# Patient Record
Sex: FEMALE | Race: WHITE | NOT HISPANIC OR LATINO | Employment: UNEMPLOYED | URBAN - METROPOLITAN AREA
[De-identification: names, ages, dates, MRNs, and addresses within clinical notes are randomized per-mention and may not be internally consistent; named-entity substitution may affect disease eponyms.]

---

## 2018-06-04 ENCOUNTER — TELEPHONE (OUTPATIENT)
Dept: SURGERY | Facility: CLINIC | Age: 43
End: 2018-06-04

## 2018-06-04 NOTE — TELEPHONE ENCOUNTER
Spoke with pt and she will be in town the week of 7/2 and would like an appt. Informed pt I do not have anything available that week due to the holiday but I will see if Dr. Lozoya can see her on another day when he is here. I will call pt back for this appt.

## 2018-06-04 NOTE — TELEPHONE ENCOUNTER
----- Message from Nohemi Gregory sent at 6/4/2018  9:06 AM CDT -----  Contact: Pt  Pt is calling to schedule a f/u appt and can be reached at [phone].  Was not able to find any available appts with , offered pt an appt with someone else which she declined.    Thank you

## 2018-06-18 ENCOUNTER — TELEPHONE (OUTPATIENT)
Dept: SURGERY | Facility: CLINIC | Age: 43
End: 2018-06-18

## 2018-06-18 NOTE — TELEPHONE ENCOUNTER
Attempted to call  Pt x2, I notice her an the schedule, I called to offer her an sooner appointment in breast clinic as she had scheduled her self in general surgery.  No answer unable to leave message as the mail box was full

## 2018-06-24 ENCOUNTER — PATIENT MESSAGE (OUTPATIENT)
Dept: SURGERY | Facility: CLINIC | Age: 43
End: 2018-06-24

## 2018-07-03 ENCOUNTER — OFFICE VISIT (OUTPATIENT)
Dept: SURGERY | Facility: CLINIC | Age: 43
End: 2018-07-03
Payer: COMMERCIAL

## 2018-07-03 VITALS
SYSTOLIC BLOOD PRESSURE: 119 MMHG | WEIGHT: 120.38 LBS | HEIGHT: 62 IN | BODY MASS INDEX: 22.15 KG/M2 | DIASTOLIC BLOOD PRESSURE: 71 MMHG | HEART RATE: 78 BPM

## 2018-07-03 VITALS
HEART RATE: 86 BPM | TEMPERATURE: 99 F | HEIGHT: 62 IN | BODY MASS INDEX: 21.86 KG/M2 | WEIGHT: 118.81 LBS | SYSTOLIC BLOOD PRESSURE: 121 MMHG | DIASTOLIC BLOOD PRESSURE: 74 MMHG

## 2018-07-03 DIAGNOSIS — Z43.4 ATTENTION TO ARTIFICIAL OPENING OF DIGESTIVE TRACT: Primary | ICD-10-CM

## 2018-07-03 DIAGNOSIS — N64.52 BLOODY DISCHARGE FROM LEFT NIPPLE: Primary | ICD-10-CM

## 2018-07-03 DIAGNOSIS — K62.4 ANAL STENOSIS: ICD-10-CM

## 2018-07-03 PROCEDURE — 99213 OFFICE O/P EST LOW 20 MIN: CPT | Mod: 25,S$GLB,, | Performed by: COLON & RECTAL SURGERY

## 2018-07-03 PROCEDURE — 45300 PROCTOSIGMOIDOSCOPY DX: CPT | Mod: S$GLB,,, | Performed by: COLON & RECTAL SURGERY

## 2018-07-03 PROCEDURE — 3008F BODY MASS INDEX DOCD: CPT | Mod: CPTII,S$GLB,, | Performed by: COLON & RECTAL SURGERY

## 2018-07-03 PROCEDURE — 99999 PR PBB SHADOW E&M-EST. PATIENT-LVL III: CPT | Mod: PBBFAC,,, | Performed by: COLON & RECTAL SURGERY

## 2018-07-03 PROCEDURE — 99244 OFF/OP CNSLTJ NEW/EST MOD 40: CPT | Mod: S$GLB,,, | Performed by: SURGERY

## 2018-07-03 PROCEDURE — 99999 PR PBB SHADOW E&M-EST. PATIENT-LVL III: CPT | Mod: PBBFAC,,, | Performed by: SURGERY

## 2018-07-03 RX ORDER — CHOLECALCIFEROL (VITAMIN D3) 25 MCG
TABLET ORAL
COMMUNITY

## 2018-07-03 RX ORDER — GINKGO BILOBA LEAF EXTRACT 60 MG
CAPSULE ORAL
COMMUNITY

## 2018-07-03 RX ORDER — IBUPROFEN 100 MG/5ML
1000 SUSPENSION, ORAL (FINAL DOSE FORM) ORAL
COMMUNITY

## 2018-07-03 RX ORDER — VITAMIN B COMPLEX
CAPSULE ORAL
COMMUNITY

## 2018-07-03 NOTE — PROGRESS NOTES
Patient ID:  Melonie Orta is a 43 y.o. female     Chief Complaint: No chief complaint on file.       HPI:for follow up of restorative proctocolectomy with J-pouch IPAA 11/10/1998 for UC  12/16/1998 - ileostomy closed. Having 4-6 BMs/day. Mild anal stenosis otherwise doing fine.  Patient denies other Bowel complaints.    ROS:        Constitutional: No fever, chills, activity or appetite change.      HENT: No hearing loss, facial swelling, neck pain or stiffness.       Eyes: No discharge, itching and visual disturbance.      Respiratory: No apnea, cough, choking or shortness of breath.       Cardiovascular: No leg swelling or chest pain      Gastrointestinal: No abdominal distention or change in bowel habbits     Genitourinary: No dysuria, frequency or flank pain.      Musculoskeletal: No arthralgias or gait problem.      Neurological: No dizziness, seizures or weakness.      Hematological: No adenopathy.      Psychiatric/Behavioral: No hallucinations or behavioral problems.       PE:    APPEARANCE: Well nourished, well developed, in no acute distress.   CHEST: Lungs clear. Normal respiratory effort.  CARDIOVASCULAR: Normal rhythm. No edema.  ABDOMEN: Soft. No tenderness or masses.  Rectum:  Normal skin, NST, mild stenosis  Musculoskeletal: Symmetric, normal range of motion and strength.   Neurological: Alert and oriented to person, place, and time. Normal reflexes.   Skin: Skin is warm and dry.   Psychiatric: Normal mood and affect. Behavior is normal and appropriate.     PROCEDURE:Rigid pouchoscopy   Scope Pediatric  With informed consent the flexible sigmoidoscope was passed 15 cm under direcet vision. Prep quality: good    Findings: normal pouch mucosa  EBL: None    The procedure was tolerated well.      Impression: normal J-pouch   PLAN: RTC 2 years or PRN

## 2018-07-03 NOTE — LETTER
[facility]  [address]  [facility] [address]  Phone: [phone]  Fax: [phone] July 6, 2018        Skyler Romero MD  [address] [place] [id number]    Patient: Melonie Orta   MR Number: [id number]   YOB: 1975   Date of Visit: 7/3/2018     Dear Dr. Romero:    Thank you for referring Melonie Orta to me for evaluation. Attached you will find relevant portions of my assessment and plan of care.    Melonie Orta is a very pleasant 43-year-old  female from [place], [place].  The patient had a left breast biopsy in January 2018 in [place], approximately six months ago, for what she states was a benign adenoma of the central aspect of the left nipple.  She states she also has a history of right breast atypical lobular hyperplasia.  She presents due to nonhealing of the biopsy site on the left nipple.      The clinical breast exam reveals bilateral symmetrical breasts with no suspicious masses, nodules or densities.  There is no lymphadenopathy.  In terms of the right nipple, there are no suspicious lesions and no nipple discharge on the right.  On the left, there is a slightly ulcerative nonhealing central aspect of the left nipple in the center of it, which may represent some residual adenoma.  On palpation, I can express some dark old blood/bloody nipple discharge, which may be pathologic since it is coming from a single solitary duct in the upper inner quadrant of the nipple on the left side.  The outside images will be reviewed here provided that there are no suspicious abnormalities.  I have recommended a left nipple duct excision of the left upper inner quadrant nipple duct with the pathologic solitary duct bloody nipple discharge to rule out an occult lesion.  We told her that the typical etiology is either intraductal papilloma or duct ectasia in 90% to 95% of the cases, but occasionally this may represent an occult early breast carcinoma.  At the  time of duct excision, I would also like a punch biopsy of the remainder of the nonhealing central duct where the adenoma had been previously biopsied to permit healing of this central aspect of the nipple.    Time spent with the patient today was about 45 minutes reviewing her records and in counseling and in coordinating anticipated surgery.  The patient will contact us when she wishes to proceed with the surgery.    If you have questions, please do not hesitate to call me. I look forward to following Melonie Orta along with you.    Sincerely,    Mo Smiley MD  Medical Director, [facility]  Staff Attending Surgeon - Department of Surgery  [facility]  Associate Professor of Surgery  [employer]  [employer]    RLC/hcr

## 2018-07-03 NOTE — PROGRESS NOTES
Subjective:       Patient ID: Melonie Orta is a 43 y.o. female.    Chief Complaint: None healing left nipple wound    HPI:  Melonie Orta is a 43 y.o. female with history of UC s/p colectomy who presents for evaluation of non-healing wound. Patient was seen at an outside facility for left nipple drainage after she noticed bloody and purulent discharge in her bra. A mammogram showed dense breast tissue with calcifications in the upper outer quadrant of the right breast. This area was excised and pathology was consistent with atypical lobular hyperplasia with small fibroadenoma and areas of fibroadenomatoid hyperplasia. The left nipple was also biopsied and pathology was consistent with nipple adenoma. Since that time, the right breast excisional biopsy site has healed well but the area of the nipple biopsy has not healed completely. She denies further nipple discharge but states that a small opening has not been able to close. Occasionally, the area will close over and then reopen after she takes a hot shower. She denies pain or drainage.    Gyn History:  Menarche age 9  Has regular menstrual cycle  No history of hormone replacement  , breast fed baby without difficulty    No past medical history on file.  No past surgical history on file.  No family history on file.  Social History     Social History    Marital status: Single     Spouse name: N/A    Number of children: N/A    Years of education: N/A     Social History Main Topics    Smoking status: Never Smoker    Smokeless tobacco: None    Alcohol use None    Drug use: Unknown    Sexual activity: Not Asked     Other Topics Concern    None     Social History Narrative    None       Current Outpatient Prescriptions   Medication Sig Dispense Refill    ascorbic acid, vitamin C, (VITAMIN C) 1000 MG tablet Take 1,000 mg by mouth.      b complex vitamins capsule Take by mouth.      evening primrose oil (EVENING PRIMROSE) 500 mg Cap Take by mouth.    "   vitamin D 1000 units Tab Take by mouth.       No current facility-administered medications for this visit.      Review of patient's allergies indicates:  No Known Allergies    Review of Systems   Constitutional: Negative for chills and fever.   HENT: Negative for congestion and sore throat.    Respiratory: Negative for cough and shortness of breath.    Cardiovascular: Negative for chest pain and palpitations.   Gastrointestinal: Negative for abdominal distention and constipation.   Musculoskeletal: Negative for arthralgias and myalgias.   Neurological: Negative for dizziness and headaches.   Psychiatric/Behavioral: Negative for agitation and confusion.       Objective:      Vitals:    07/03/18 1000   BP: 121/74   BP Location: Right arm   Patient Position: Sitting   BP Method: Medium (Automatic)   Pulse: 86   Temp: 98.5 °F (36.9 °C)   Weight: 53.9 kg (118 lb 13.3 oz)   Height: 5' 2" (1.575 m)     Physical Exam   Constitutional: She is oriented to person, place, and time. She appears well-developed and well-nourished. No distress.   Cardiovascular: Normal rate.    Pulmonary/Chest: Effort normal. Right breast exhibits no inverted nipple, no mass, no nipple discharge, no skin change and no tenderness. Left breast exhibits no inverted nipple, no mass, no nipple discharge, no skin change and no tenderness.       Right excisional biopsy incision c/d/i, healing well  Left nipple with pinpoint opening at 1 o'clock position. No erythema or skin changes. Pathologic dark brown discharge expressed from single duct at 11 oclock.   Abdominal: She exhibits no distension. There is no tenderness.   Neurological: She is alert and oriented to person, place, and time.   Skin: Skin is warm and dry.   Psychiatric: She has a normal mood and affect. Her behavior is normal.        Assessment:      Nonhealing left breast wound with pathologic nipple discharge    Plan:     Duct excision with punch biopsy of nonhealing adenoma.  I have " personally taken the history and examined this patient and agree with the resident's note as stated above.  Melonie Orta is a very pleasant 43-year-old  female from [place], [place].  The patient had a left breast biopsy in January 2018 in [place],   approximately six months ago, for what she states was a benign adenoma of the   central aspect of the left nipple.  She states she also has a history of right   breast atypical lobular hyperplasia.  She presents due to nonhealing of the   biopsy site on the left nipple.  The clinical breast exam reveals bilateral   symmetrical breasts with no suspicious masses, nodules or densities.  There is   no lymphadenopathy.  In terms of the right nipple, there are no suspicious   lesions and no nipple discharge on the right.  On the left, there is a slightly   ulcerative nonhealing central aspect of the left nipple in the center of it,   which may represent some residual adenoma.  On palpation, I can express some   dark old blood/bloody nipple discharge, which may be pathologic since it is   coming from a single solitary duct in the upper inner quadrant of the nipple on   the left side.  The outside images will be reviewed here provided that there are   no suspicious abnormalities.  I have recommended a left nipple duct excision of   the left upper inner quadrant nipple duct with the pathologic solitary duct   bloody nipple discharge to rule out an occult lesion.  We told her that the   typical etiology is either intraductal papilloma or duct ectasia in 90% to 95%   of the cases, but occasionally this may represent an occult early breast   carcinoma.  At the time of duct excision, I would also like a punch biopsy of   the remainder of the nonhealing central duct where the adenoma had been   previously biopsied to permit healing of this central aspect of the nipple.    Time spent with the patient today was about 45 minutes reviewing her records and   in counseling  and in coordinating anticipated surgery.  The patient will   contact us when she wishes to proceed with the surgery.      RLC/HN  dd: 07/06/2018 18:45:49 (CDT)  td: 07/07/2018 00:26:02 (CDT)  Doc ID   #[id number]  Job ID #[id number]    CC:     Job #  [id number].

## 2018-07-06 ENCOUNTER — HOSPITAL ENCOUNTER (OUTPATIENT)
Dept: RADIOLOGY | Facility: HOSPITAL | Age: 43
Discharge: HOME | End: 2018-07-06
Attending: SURGERY

## 2018-07-06 DIAGNOSIS — R92.8 ABNORMAL MAMMOGRAM: ICD-10-CM

## 2018-07-06 PROBLEM — N64.52 BLOODY DISCHARGE FROM LEFT NIPPLE: Status: ACTIVE | Noted: 2018-07-06

## 2018-07-17 ENCOUNTER — PATIENT MESSAGE (OUTPATIENT)
Dept: SURGERY | Facility: CLINIC | Age: 43
End: 2018-07-17

## 2018-07-18 ENCOUNTER — TELEPHONE (OUTPATIENT)
Dept: SURGERY | Facility: CLINIC | Age: 43
End: 2018-07-18

## 2018-07-18 NOTE — TELEPHONE ENCOUNTER
----- Message from Milvia Young sent at 7/18/2018  1:53 PM CDT -----  Contact: pt   Please call the Pt  Back she's just missed the call your can reach her at [phone]

## 2018-07-23 DIAGNOSIS — N64.52 NIPPLE DISCHARGE: Primary | ICD-10-CM

## 2018-08-23 ENCOUNTER — ANESTHESIA EVENT (OUTPATIENT)
Dept: SURGERY | Facility: HOSPITAL | Age: 43
End: 2018-08-23
Payer: COMMERCIAL

## 2018-08-23 NOTE — ANESTHESIA PREPROCEDURE EVALUATION
[facility]  Anesthesia Pre-Operative Evaluation         Patient Name: Melonie Orta  YOB: 1975  MRN: [id number]    SUBJECTIVE:     Pre-operative evaluation for Procedure(s) (LRB):  EXCISION-DUCT left nipple punch biopsy of the remainder of the nonhealing central duct where the adenoma had been  previously biopsied of the left nipple/breast (Left)     08/23/2018    Melonie Orta is a 43 y.o. female w/ a significant PMHx of ulcerative colitis s/p proctocolectomy with ileal pouch anal anastomosis with subsequent ileostomy closure who presented with a non-healing left nipple wound.  Patient underwent a mammogram which revealed dense breast tissue with calcification with subsequent needle localization and excisional biopsy in .  Pathology was consistent with atypical lobular hyperplasia with small fibroadenoma.  The left nipple was biopsied as well with pathology resulting as a nipple adenoma.     .    Patient now presents for the above procedure(s).      LDA: None documented.    Prev airway: None documented.    Drips: None documented.    Patient Active Problem List   Diagnosis    Attention to artificial opening of digestive tract    Anal stenosis    Bloody discharge from left nipple       Review of patient's allergies indicates:  No Known Allergies    Current Outpatient Medications:  No current facility-administered medications for this encounter.     Current Outpatient Medications:     ascorbic acid, vitamin C, (VITAMIN C) 1000 MG tablet, Take 1,000 mg by mouth., Disp: , Rfl:     b complex vitamins capsule, Take by mouth., Disp: , Rfl:     evening primrose oil (EVENING PRIMROSE) 500 mg Cap, Take by mouth., Disp: , Rfl:     vitamin D 1000 units Tab, Take by mouth., Disp: , Rfl:     No past surgical history on file.    Social History     Socioeconomic History    Marital status: Single     Spouse name: Not on file    Number of children: Not on file    Years of  education: Not on file    Highest education level: Not on file   Social Needs    Financial resource strain: Not on file    Food insecurity - worry: Not on file    Food insecurity - inability: Not on file    Transportation needs - medical: Not on file    Transportation needs - non-medical: Not on file   Occupational History    Not on file   Tobacco Use    Smoking status: Never Smoker   Substance and Sexual Activity    Alcohol use: Not on file    Drug use: Not on file    Sexual activity: Not on file   Other Topics Concern    Not on file   Social History Narrative    Not on file       OBJECTIVE:     Vital Signs Range (Last 24H):         Significant Labs:  No results found for: WBC, HGB, HCT, PLT, CHOL, TRIG, HDL, LDLDIRECT, ALT, AST, NA, K, CL, CREATININE, BUN, CO2, TSH, PSA, INR, GLUF, HGBA1C, MICROALBUR    Diagnostic Studies: No relevant studies.    EKG: No recent studies available.    2D ECHO:  No results found for this or any previous visit.      ASSESSMENT/PLAN:         Anesthesia Evaluation    I have reviewed the Patient Summary Reports.    I have reviewed the Nursing Notes.   I have reviewed the Medications.     Review of Systems  Anesthesia Hx:  No problems with previous Anesthesia Denies Hx of Anesthetic complications Ponv. Scop patch worked well last time Personal Hx of Anesthesia complications, Post-Operative Nausea/Vomiting, in the past, but not with recent anesthetics / prophylaxis   Social:  Non-Smoker    Hematology/Oncology:  Hematology Normal   Oncology Normal     EENT/Dental:EENT/Dental Normal   Cardiovascular:  Cardiovascular Normal Exercise tolerance: good     Pulmonary:  Pulmonary Normal    Renal/:  Renal/ Normal     Hepatic/GI:   UC with proctocolectomy in 1998   Musculoskeletal:  Musculoskeletal Normal    Neurological:  Neurology Normal    Endocrine:  Endocrine Normal    Dermatological:   Per HPI   Psych:  Psychiatric Normal           Physical Exam  General:  Well nourished     Airway/Jaw/Neck:  Airway Findings: Mouth Opening: Normal Tongue: Normal  General Airway Assessment: Adult  TM Distance: Normal, at least 6 cm      Dental:  Dental Findings: In tact        Mental Status:  Mental Status Findings:  Cooperative, Alert and Oriented         Anesthesia Plan  Type of Anesthesia, risks & benefits discussed:  Anesthesia Type:  general  Patient's Preference:   Intra-op Monitoring Plan: standard ASA monitors  Intra-op Monitoring Plan Comments:   Post Op Pain Control Plan: per primary service following discharge from PACU and multimodal analgesia  Post Op Pain Control Plan Comments:   Induction:   IV  Beta Blocker:  Patient is not currently on a Beta-Blocker (No further documentation required).       Informed Consent: Patient understands risks and agrees with Anesthesia plan.  Questions answered. Anesthesia consent signed with patient.  ASA Score: 2     Day of Surgery Review of History & Physical:    H&P update referred to the surgeon.     Anesthesia Plan Notes: Scop patch for ponv        Ready For Surgery From Anesthesia Perspective.

## 2018-08-24 ENCOUNTER — HOSPITAL ENCOUNTER (OUTPATIENT)
Facility: HOSPITAL | Age: 43
Discharge: HOME | End: 2018-08-24
Attending: SURGERY | Admitting: SURGERY
Payer: COMMERCIAL

## 2018-08-24 ENCOUNTER — ANESTHESIA (OUTPATIENT)
Dept: SURGERY | Facility: HOSPITAL | Age: 43
End: 2018-08-24
Payer: COMMERCIAL

## 2018-08-24 VITALS
HEIGHT: 62 IN | WEIGHT: 118 LBS | SYSTOLIC BLOOD PRESSURE: 103 MMHG | BODY MASS INDEX: 21.71 KG/M2 | TEMPERATURE: 99 F | DIASTOLIC BLOOD PRESSURE: 71 MMHG | RESPIRATION RATE: 16 BRPM | HEART RATE: 66 BPM | OXYGEN SATURATION: 100 %

## 2018-08-24 DIAGNOSIS — N64.9 NIPPLE LESION: Primary | ICD-10-CM

## 2018-08-24 LAB
B-HCG UR QL: NEGATIVE
CTP QC/QA: YES

## 2018-08-24 PROCEDURE — 63600175 PHARM REV CODE 636 W HCPCS: Performed by: SURGERY

## 2018-08-24 PROCEDURE — D9220A PRA ANESTHESIA: Mod: ,,, | Performed by: ANESTHESIOLOGY

## 2018-08-24 PROCEDURE — 25000003 PHARM REV CODE 250: Performed by: SURGERY

## 2018-08-24 PROCEDURE — 94761 N-INVAS EAR/PLS OXIMETRY MLT: CPT

## 2018-08-24 PROCEDURE — 36000707: Performed by: SURGERY

## 2018-08-24 PROCEDURE — S0020 INJECTION, BUPIVICAINE HYDRO: HCPCS | Performed by: SURGERY

## 2018-08-24 PROCEDURE — 81025 URINE PREGNANCY TEST: CPT | Performed by: SURGERY

## 2018-08-24 PROCEDURE — 71000033 HC RECOVERY, INTIAL HOUR: Performed by: SURGERY

## 2018-08-24 PROCEDURE — 25000003 PHARM REV CODE 250: Performed by: ANESTHESIOLOGY

## 2018-08-24 PROCEDURE — 37000009 HC ANESTHESIA EA ADD 15 MINS: Performed by: SURGERY

## 2018-08-24 PROCEDURE — 25000003 PHARM REV CODE 250: Performed by: STUDENT IN AN ORGANIZED HEALTH CARE EDUCATION/TRAINING PROGRAM

## 2018-08-24 PROCEDURE — 71000015 HC POSTOP RECOV 1ST HR: Performed by: SURGERY

## 2018-08-24 PROCEDURE — 19110 NIPPLE EXPLORATION: CPT | Mod: LT,,, | Performed by: SURGERY

## 2018-08-24 PROCEDURE — 88305 TISSUE EXAM BY PATHOLOGIST: CPT | Mod: 26,,, | Performed by: PATHOLOGY

## 2018-08-24 PROCEDURE — 37000008 HC ANESTHESIA 1ST 15 MINUTES: Performed by: SURGERY

## 2018-08-24 PROCEDURE — 88305 TISSUE EXAM BY PATHOLOGIST: CPT | Performed by: PATHOLOGY

## 2018-08-24 PROCEDURE — 36000706: Performed by: SURGERY

## 2018-08-24 RX ORDER — ONDANSETRON 2 MG/ML
4 INJECTION INTRAMUSCULAR; INTRAVENOUS ONCE AS NEEDED
Status: DISCONTINUED | OUTPATIENT
Start: 2018-08-24 | End: 2018-08-24 | Stop reason: HOSPADM

## 2018-08-24 RX ORDER — LIDOCAINE HCL/PF 100 MG/5ML
SYRINGE (ML) INTRAVENOUS
Status: DISCONTINUED | OUTPATIENT
Start: 2018-08-24 | End: 2018-08-24

## 2018-08-24 RX ORDER — PHENYLEPHRINE HYDROCHLORIDE 10 MG/ML
INJECTION INTRAVENOUS
Status: DISCONTINUED | OUTPATIENT
Start: 2018-08-24 | End: 2018-08-24

## 2018-08-24 RX ORDER — PROPOFOL 10 MG/ML
VIAL (ML) INTRAVENOUS
Status: DISCONTINUED | OUTPATIENT
Start: 2018-08-24 | End: 2018-08-24

## 2018-08-24 RX ORDER — OXYCODONE AND ACETAMINOPHEN 5; 325 MG/1; MG/1
1 TABLET ORAL EVERY 4 HOURS PRN
Qty: 10 TABLET | Refills: 0 | Status: SHIPPED | OUTPATIENT
Start: 2018-08-24

## 2018-08-24 RX ORDER — CEFAZOLIN SODIUM 1 G/3ML
INJECTION, POWDER, FOR SOLUTION INTRAMUSCULAR; INTRAVENOUS
Status: DISCONTINUED | OUTPATIENT
Start: 2018-08-24 | End: 2018-08-24

## 2018-08-24 RX ORDER — SCOLOPAMINE TRANSDERMAL SYSTEM 1 MG/1
1 PATCH, EXTENDED RELEASE TRANSDERMAL ONCE
Status: DISCONTINUED | OUTPATIENT
Start: 2018-08-24 | End: 2018-08-24 | Stop reason: HOSPADM

## 2018-08-24 RX ORDER — HYDROCODONE BITARTRATE AND ACETAMINOPHEN 5; 325 MG/1; MG/1
TABLET ORAL
Status: DISCONTINUED
Start: 2018-08-24 | End: 2018-08-24 | Stop reason: HOSPADM

## 2018-08-24 RX ORDER — ONDANSETRON 2 MG/ML
INJECTION INTRAMUSCULAR; INTRAVENOUS
Status: DISCONTINUED | OUTPATIENT
Start: 2018-08-24 | End: 2018-08-24

## 2018-08-24 RX ORDER — SODIUM CHLORIDE 0.9 % (FLUSH) 0.9 %
3 SYRINGE (ML) INJECTION
Status: DISCONTINUED | OUTPATIENT
Start: 2018-08-24 | End: 2018-08-24 | Stop reason: HOSPADM

## 2018-08-24 RX ORDER — SODIUM CHLORIDE 9 MG/ML
INJECTION, SOLUTION INTRAVENOUS CONTINUOUS
Status: DISCONTINUED | OUTPATIENT
Start: 2018-08-24 | End: 2018-08-24 | Stop reason: HOSPADM

## 2018-08-24 RX ORDER — MIDAZOLAM HYDROCHLORIDE 1 MG/ML
INJECTION, SOLUTION INTRAMUSCULAR; INTRAVENOUS
Status: DISCONTINUED | OUTPATIENT
Start: 2018-08-24 | End: 2018-08-24

## 2018-08-24 RX ORDER — FENTANYL CITRATE 50 UG/ML
INJECTION, SOLUTION INTRAMUSCULAR; INTRAVENOUS
Status: DISCONTINUED | OUTPATIENT
Start: 2018-08-24 | End: 2018-08-24

## 2018-08-24 RX ORDER — LIDOCAINE HYDROCHLORIDE 10 MG/ML
1 INJECTION, SOLUTION EPIDURAL; INFILTRATION; INTRACAUDAL; PERINEURAL ONCE
Status: COMPLETED | OUTPATIENT
Start: 2018-08-24 | End: 2018-08-24

## 2018-08-24 RX ORDER — BUPIVACAINE HYDROCHLORIDE 5 MG/ML
INJECTION, SOLUTION EPIDURAL; INTRACAUDAL
Status: DISCONTINUED | OUTPATIENT
Start: 2018-08-24 | End: 2018-08-24 | Stop reason: HOSPADM

## 2018-08-24 RX ORDER — DEXAMETHASONE SODIUM PHOSPHATE 4 MG/ML
INJECTION, SOLUTION INTRA-ARTICULAR; INTRALESIONAL; INTRAMUSCULAR; INTRAVENOUS; SOFT TISSUE
Status: DISCONTINUED | OUTPATIENT
Start: 2018-08-24 | End: 2018-08-24

## 2018-08-24 RX ORDER — HYDROMORPHONE HYDROCHLORIDE 1 MG/ML
0.2 INJECTION, SOLUTION INTRAMUSCULAR; INTRAVENOUS; SUBCUTANEOUS EVERY 5 MIN PRN
Status: DISCONTINUED | OUTPATIENT
Start: 2018-08-24 | End: 2018-08-24 | Stop reason: HOSPADM

## 2018-08-24 RX ORDER — HYDROCODONE BITARTRATE AND ACETAMINOPHEN 5; 325 MG/1; MG/1
1 TABLET ORAL EVERY 4 HOURS PRN
Status: DISCONTINUED | OUTPATIENT
Start: 2018-08-24 | End: 2018-08-24 | Stop reason: HOSPADM

## 2018-08-24 RX ORDER — ACETAMINOPHEN 10 MG/ML
INJECTION, SOLUTION INTRAVENOUS
Status: DISCONTINUED | OUTPATIENT
Start: 2018-08-24 | End: 2018-08-24

## 2018-08-24 RX ORDER — ONDANSETRON 8 MG/1
8 TABLET, ORALLY DISINTEGRATING ORAL EVERY 8 HOURS PRN
Status: DISCONTINUED | OUTPATIENT
Start: 2018-08-24 | End: 2018-08-24 | Stop reason: HOSPADM

## 2018-08-24 RX ADMIN — SODIUM CHLORIDE, SODIUM GLUCONATE, SODIUM ACETATE, POTASSIUM CHLORIDE, MAGNESIUM CHLORIDE, SODIUM PHOSPHATE, DIBASIC, AND POTASSIUM PHOSPHATE: .53; .5; .37; .037; .03; .012; .00082 INJECTION, SOLUTION INTRAVENOUS at 11:08

## 2018-08-24 RX ADMIN — SCOPALAMINE 1 PATCH: 1 PATCH, EXTENDED RELEASE TRANSDERMAL at 08:08

## 2018-08-24 RX ADMIN — PHENYLEPHRINE HYDROCHLORIDE 100 MCG: 10 INJECTION INTRAVENOUS at 11:08

## 2018-08-24 RX ADMIN — PHENYLEPHRINE HYDROCHLORIDE 200 MCG: 10 INJECTION INTRAVENOUS at 11:08

## 2018-08-24 RX ADMIN — PROPOFOL 300 MG: 10 INJECTION, EMULSION INTRAVENOUS at 11:08

## 2018-08-24 RX ADMIN — ONDANSETRON 4 MG: 2 INJECTION INTRAMUSCULAR; INTRAVENOUS at 11:08

## 2018-08-24 RX ADMIN — LIDOCAINE HYDROCHLORIDE 50 MG: 20 INJECTION, SOLUTION INTRAVENOUS at 11:08

## 2018-08-24 RX ADMIN — FENTANYL CITRATE 50 MCG: 50 INJECTION, SOLUTION INTRAMUSCULAR; INTRAVENOUS at 11:08

## 2018-08-24 RX ADMIN — CEFAZOLIN 2 G: 330 INJECTION, POWDER, FOR SOLUTION INTRAMUSCULAR; INTRAVENOUS at 11:08

## 2018-08-24 RX ADMIN — LIDOCAINE HYDROCHLORIDE 10 MG: 10 INJECTION, SOLUTION EPIDURAL; INFILTRATION; INTRACAUDAL; PERINEURAL at 08:08

## 2018-08-24 RX ADMIN — MIDAZOLAM HYDROCHLORIDE 2 MG: 1 INJECTION, SOLUTION INTRAMUSCULAR; INTRAVENOUS at 10:08

## 2018-08-24 RX ADMIN — ACETAMINOPHEN 1000 MG: 10 INJECTION, SOLUTION INTRAVENOUS at 11:08

## 2018-08-24 RX ADMIN — DEXAMETHASONE SODIUM PHOSPHATE 4 MG: 4 INJECTION, SOLUTION INTRAMUSCULAR; INTRAVENOUS at 11:08

## 2018-08-24 RX ADMIN — SODIUM CHLORIDE: 0.9 INJECTION, SOLUTION INTRAVENOUS at 10:08

## 2018-08-24 RX ADMIN — SODIUM CHLORIDE: 0.9 INJECTION, SOLUTION INTRAVENOUS at 08:08

## 2018-08-24 RX ADMIN — HYDROCODONE BITARTRATE AND ACETAMINOPHEN 1 TABLET: 5; 325 TABLET ORAL at 12:08

## 2018-08-24 NOTE — H&P
Subjective:       Patient ID: Melonie Orta is a 43 y.o. female.     Chief Complaint: None healing left nipple wound     HPI:  Melonie Orta is a 43 y.o. female with history of UC s/p colectomy who presents for evaluation of non-healing wound. Patient was seen at an outside facility for left nipple drainage after she noticed bloody and purulent discharge in her bra. A mammogram showed dense breast tissue with calcifications in the upper outer quadrant of the right breast. This area was excised and pathology was consistent with atypical lobular hyperplasia with small fibroadenoma and areas of fibroadenomatoid hyperplasia. The left nipple was also biopsied and pathology was consistent with nipple adenoma. Since that time, the right breast excisional biopsy site has healed well but the area of the nipple biopsy has not healed completely. She denies further nipple discharge but states that a small opening has not been able to close. Occasionally, the area will close over and then reopen after she takes a hot shower. She denies pain or drainage.     Gyn History:  Menarche age 9  Has regular menstrual cycle  No history of hormone replacement  , breast fed baby without difficulty     No past medical history on file.  No past surgical history on file.  No family history on file.  Social History            Social History    Marital status: Single       Spouse name: N/A    Number of children: N/A    Years of education: N/A           Social History Main Topics    Smoking status: Never Smoker    Smokeless tobacco: None    Alcohol use None    Drug use: Unknown    Sexual activity: Not Asked           Other Topics Concern    None          Social History Narrative    None         Current Medications          Current Outpatient Prescriptions   Medication Sig Dispense Refill    ascorbic acid, vitamin C, (VITAMIN C) 1000 MG tablet Take 1,000 mg by mouth.        b complex vitamins capsule Take by mouth.         evening primrose oil (EVENING PRIMROSE) 500 mg Cap Take by mouth.        vitamin D 1000 units Tab Take by mouth.          No current facility-administered medications for this visit.          Review of patient's allergies indicates:  No Known Allergies     Review of Systems   Constitutional: Negative for chills and fever.   HENT: Negative for congestion and sore throat.    Respiratory: Negative for cough and shortness of breath.    Cardiovascular: Negative for chest pain and palpitations.   Gastrointestinal: Negative for abdominal distention and constipation.   Musculoskeletal: Negative for arthralgias and myalgias.   Neurological: Negative for dizziness and headaches.   Psychiatric/Behavioral: Negative for agitation and confusion.       Objective:   Vitals     Vitals:    08/24/18 0821   BP: 96/69   Pulse: 72   Resp: 16   Temp: 98.2 °F (36.8 °C)       Physical Exam   Constitutional: She is oriented to person, place, and time. She appears well-developed and well-nourished. No distress.   Cardiovascular: Normal rate.    Pulmonary/Chest: Effort normal. Right breast exhibits no inverted nipple, no mass, no nipple discharge, no skin change and no tenderness. Left breast exhibits no inverted nipple, no mass, no nipple discharge, no skin change and no tenderness.       Right excisional biopsy incision c/d/i, healing well  Left nipple with pinpoint opening at 1 o'clock position. No erythema or skin changes. Pathologic dark brown discharge expressed from single duct at 11 oclock.   Abdominal: She exhibits no distension. There is no tenderness.   Neurological: She is alert and oriented to person, place, and time.   Skin: Skin is warm and dry.   Psychiatric: She has a normal mood and affect. Her behavior is normal.      Assessment:      Nonhealing left breast wound with pathologic nipple discharge     Plan:      Duct excision with punch biopsy of nonhealing adenoma.  Consent signed  Site marked      Binu Montero M.D.  General  Surgery PGY3  [phone]

## 2018-08-24 NOTE — TRANSFER OF CARE
"Anesthesia Transfer of Care Note    Patient: Melonie Orta    Procedure(s) Performed: Procedure(s) (LRB):  EXCISION-DUCT left nipple punch biopsy of the remainder of the nonhealing central duct where the adenoma had been  previously biopsied of the left nipple/breast (Left)    Patient location: PACU    Transport from OR: Transported from OR on 6-10 L/min O2 by face mask with adequate spontaneous ventilation    Post pain: adequate analgesia    Post assessment: no apparent anesthetic complications    Post vital signs: stable    Level of consciousness: awake, alert and oriented    Nausea/Vomiting: no nausea/vomiting    Complications: none    Transfer of care protocol was followed      Last vitals:   Visit Vitals  /65   Pulse 65   Temp 36.5 °C (97.7 °F) (Temporal)   Resp 15   Ht 5' 2" (1.575 m)   Wt 53.5 kg (118 lb)   LMP 08/18/2018 (Exact Date)   SpO2 100%   Breastfeeding? No   BMI 21.58 kg/m²     "

## 2018-08-24 NOTE — OP NOTE
DATE OF PROCEDURE:  08/24/2018    PRIMARY SURGEON:  Mo Smiley M.D.    ASSISTANT:  Binu Montero M.D. (RES)    PREOPERATIVE DIAGNOSIS:  Left breast nipple discharge and nonhealing nipple from   prior biopsy for benign adenoma.    POSTOPERATIVE DIAGNOSIS:  Left breast nipple discharge and nonhealing nipple   from prior biopsy for benign adenoma.    PROCEDURE:  Excision of left breast (terminal nipple duct excision of the left   breast).    ANESTHESIA:  General with laryngeal mask airway.    COMPLICATIONS:  None.    ESTIMATED BLOOD LOSS:  Minimal.    PROCEDURE IN DETAIL:  The patient underwent informed consent.  The history and   physical examination were reviewed with the patient.  The left breast was   marked.  She was brought to the Operating Room under general anesthesia with   laryngeal mask airway.  The left anterior breast was prepped and draped in a   sterile fashion.  Under loupe magnification, we attempted to elicit bloody   discharge from the central nipple duct.  There was an area of excoriation and   nonhealing, but no significant bloody discharge coming from this site.  There   was some physiologic milky discharge expressed from multiple ducts.  At this   point, the nonhealing site was fully excised using a 4 mm circular skin punch   biopsy, taking full thickness core through the nipple skin into the underlying   subcutaneous tissue and breast parenchyma.  This was excised and labeled as skin   punch excision of the nipple adenoma.  We then made a superior medial   curvilinear circumareolar incision in the upper inner quadrant.  The dissection   was carried through the deep dermis into the subcutaneous tissue.  The nipple   areolar complex was lifted and we tunneled and removed the terminal 2 to 3 cm of   the nipple duct to the area where the core had been excised.  There was no   dilated duct within this and no bloody discharge noted and no gross lesions.    This was labeled as terminal nipple  duct excision taking out the central deep   subareolar terminal nipple duct leading to the opening that had been created   with the 4 mm full thickness punch biopsy.  With this tissue submitted   separately, we now had cored out the entire area that was nonhealing as well as   the underlying terminal nipple duct.  The two separate specimens were sent to   Pathology for permanent sectioning.  The wound was made hemostatic with cautery   from within.  We reapproximated the 4 mm punch defect from the dermal aspect   with a 3-0 Vicryl figure-of-eight Z-stitch to reapproximate the skin of the   nipple from within around this perimeter.  We then placed a Z-stitch to filipe   the nipple again to its natural position again using a 3-0 Vicryl suture with   the nipple everted.  We then laid the nipple areolar complex skin back down on   the underlying breast parenchyma, reapproximated the deep dermal and   subcutaneous layers along the circumareolar incision and the skin incision was   closed with a running 4-0 Monocryl subcuticular skin closure.  Dermabond was   placed along the circumareolar incision and antibiotic ointment on the central   nipple itself cover with a dry sterile dressing and a post-procedure bra.  She   tolerated the procedure well without complication.  Estimated blood loss was   minimal.  All needle, instrument and sponge counts were correct.  The patient   was turned over to Anesthesia for extubation and transport to the recovery area   in a satisfactory condition.      HUSAM/IN  dd: 08/24/2018 15:34:14 (CDT)  td: 08/24/2018 15:57:38 (CDT)  Doc ID   #[id number]  Job ID #[id number]    CC:

## 2018-08-24 NOTE — BRIEF OP NOTE
[facility]  Brief Operative Note     SUMMARY     Surgery Date: 8/24/2018     Surgeon(s) and Role:     * Mo Smiley MD - Primary     * Binu Montero MD - Resident - Assisting        Pre-op Diagnosis:  Nipple discharge [N64.52]    Post-op Diagnosis:  Post-Op Diagnosis Codes:     * Nipple discharge [N64.52]    Procedure(s) (LRB):  EXCISION-DUCT left nipple punch biopsy of the remainder of the nonhealing central duct where the adenoma had been  previously biopsied of the left nipple/breast (Left)    Anesthesia: General    Description of the findings of the procedure: Punch biopsy of non-healing lesion on Left nipple.  Removal of subareolar tissue under non-healing lesion     Findings/Key Components: See op note     Estimated Blood Loss: 5mL         Specimens:   Specimen (12h ago, onward)    Start     Ordered    08/24/18 1150  Specimen to Pathology - Surgery  Once     Comments:  1) Left nipple Duct Adenoma - permanent2) Left Breast Sub-areolar tissue- permanent     Start Status   08/24/18 1150 Collected (08/24/18 1153)       08/24/18 1151          Discharge Note    SUMMARY     Admit Date: 8/24/2018    Discharge Date and Time:  08/24/2018     Hospital Course (synopsis of major diagnoses, care, treatment, and services provided during the course of the hospital stay): Patient was admitted for an outpatient procedure and discharged home the same day in stable condition      Final Diagnosis: Post-Op Diagnosis Codes:     * Nipple discharge [N64.52]    Disposition: Home or Self Care    Follow Up/Patient Instructions:     Medications:  Reconciled Home Medications:      Medication List      CONTINUE taking these medications    ascorbic acid (vitamin C) 1000 MG tablet  Commonly known as:  VITAMIN C  Take 1,000 mg by mouth.     b complex vitamins capsule  Take by mouth.     EVENING PRIMROSE 500 mg Cap  Generic drug:  evening primrose oil  Take by mouth.     vitamin D 1000 units Tab  Take by  mouth.          Discharge Procedure Orders   Diet general     Other restrictions (specify):   Scheduling Instructions: OK to shower in 48 hours     No dressing needed     Activity as tolerated     Follow-up Information     Mo Smiley MD In 2 weeks.    Specialty:  General Surgery  Contact information:  Josiah Shoemaker suman  [facility] [id number] [phone]

## 2018-08-24 NOTE — DISCHARGE INSTRUCTIONS
Recovery After Procedural Sedation (Adult)  You have been given medicine by vein to make you sleep during your surgery. This may have included both a pain medicine and sleeping medicine. Most of the effects have worn off. But you may still have some drowsiness for the next 6 to 8 hours.  Home care  Follow these guidelines when you get home:  · For the next 8 hours, you should be watched by a responsible adult. This person should make sure your condition is not getting worse.  · Don't drink any alcohol for the next 24 hours.  · Don't drive, operate dangerous machinery, or make important business or personal decisions during the next 24 hours.  Note: Your healthcare provider may tell you not to take any medicine by mouth for pain or sleep in the next 4 hours. These medicines may react with the medicines you were given in the hospital. This could cause a much stronger response than usual.  Follow-up care  Follow up with your healthcare provider if you are not alert and back to your usual level of activity within 12 hours.  When to seek medical advice  Call your healthcare provider right away if any of these occur:  · Drowsiness gets worse  · Weakness or dizziness gets worse  · Repeated vomiting  · You can't be awakened

## 2018-08-27 ENCOUNTER — TELEPHONE (OUTPATIENT)
Dept: SURGERY | Facility: CLINIC | Age: 43
End: 2018-08-27

## 2018-08-27 NOTE — TELEPHONE ENCOUNTER
----- Message from Nay Bañuelos sent at 8/27/2018  1:57 PM CDT -----  Contact: Pt   Pt says she is traveling and need to know if she can come in later on 9/6/2018    Pt can be reached at [phone] or [phone]    Thanks

## 2018-08-28 ENCOUNTER — PATIENT MESSAGE (OUTPATIENT)
Dept: SURGERY | Facility: CLINIC | Age: 43
End: 2018-08-28

## 2018-08-29 ENCOUNTER — TELEPHONE (OUTPATIENT)
Dept: SURGERY | Facility: CLINIC | Age: 43
End: 2018-08-29

## 2018-08-30 NOTE — ANESTHESIA POSTPROCEDURE EVALUATION
"Anesthesia Post Evaluation    Patient: Melonie Orta    Procedure(s) Performed: Procedure(s) (LRB):  EXCISION-DUCT left nipple punch biopsy of the remainder of the nonhealing central duct where the adenoma had been  previously biopsied of the left nipple/breast (Left)    Final Anesthesia Type: general  Patient location during evaluation: PACU  Patient participation: Yes- Able to Participate  Level of consciousness: awake and alert  Post-procedure vital signs: reviewed and stable  Pain management: adequate  Airway patency: patent  PONV status at discharge: No PONV  Anesthetic complications: no      Cardiovascular status: stable  Respiratory status: unassisted and spontaneous ventilation  Hydration status: euvolemic  Follow-up not needed.        Visit Vitals  /71   Pulse 66   Temp 37.1 °C (98.8 °F) (Temporal)   Resp 16   Ht 5' 2" (1.575 m)   Wt 53.5 kg (118 lb)   LMP 08/18/2018 (Exact Date)   SpO2 100%   Breastfeeding? No   BMI 21.58 kg/m²       Pain/Bertrand Score: No Data Recorded      "

## 2018-09-04 ENCOUNTER — TELEPHONE (OUTPATIENT)
Dept: SURGERY | Facility: CLINIC | Age: 43
End: 2018-09-04

## 2018-09-04 NOTE — TELEPHONE ENCOUNTER
Returned call, pt was worried about the weather for her appointment n Thursday, I informed her that it is [place]uni[place] as usual and we will call tomorrow if there is any changes

## 2018-09-04 NOTE — TELEPHONE ENCOUNTER
----- Message from Eden Mcclain sent at 9/4/2018  3:56 PM CDT -----  Contact: Pt.Self   Pt. States she would like to speak with nurse in reference to surgery please call Pt. Back @ [phone] Thank You

## 2018-09-06 ENCOUNTER — OFFICE VISIT (OUTPATIENT)
Dept: SURGERY | Facility: CLINIC | Age: 43
End: 2018-09-06
Payer: COMMERCIAL

## 2018-09-06 VITALS
BODY MASS INDEX: 21.71 KG/M2 | HEART RATE: 84 BPM | SYSTOLIC BLOOD PRESSURE: 114 MMHG | DIASTOLIC BLOOD PRESSURE: 73 MMHG | WEIGHT: 118 LBS | HEIGHT: 62 IN | TEMPERATURE: 98 F

## 2018-09-06 DIAGNOSIS — D24.2: Primary | ICD-10-CM

## 2018-09-06 PROCEDURE — 99999 PR PBB SHADOW E&M-EST. PATIENT-LVL III: CPT | Mod: PBBFAC,,, | Performed by: SURGERY

## 2018-09-06 PROCEDURE — 99024 POSTOP FOLLOW-UP VISIT: CPT | Mod: S$GLB,,, | Performed by: SURGERY

## 2018-09-06 NOTE — LETTER
Justin suman - General Surgery  [address]  [facility] [address]  Phone: [phone] September 16, 2018      Skyler Romero MD  [address] [place] [id number]    Patient: Melonie Orta   MR Number: [id number]   YOB: 1975   Date of Visit: 9/6/2018     Dear Dr. Romero:    Thank you for referring Melonie Orta to me for evaluation. Attached you will find relevant portions of my assessment and plan of care.    Patient presents for routine post-op visit. No subjective complaints. Pathology from surgery on 8-24-18 revealed benign nipple duct adenoma as expected from both nipple punch bx and connecting subareolar tissue removed at the same time. Benign pathology reviewed with patient and a copy of report provided to her.    L NAC surgical site CDI, healing appropriately with good cosmetic result and no signs of infection.  Will schedule for a new baseline left diag MMG with indira in late January or early February of 2019.    If you have questions, please do not hesitate to call me. I look forward to following Melonie Orta along with you.    Sincerely,    Mo Smiley MD  Medical Director, [facility]  Staff Attending Surgeon - Department of Surgery  [facility]  Associate Professor of Surgery  [employer]  [employer]    RLC/hcr

## 2018-09-16 PROBLEM — D24.2: Status: ACTIVE | Noted: 2018-08-24

## 2018-09-16 NOTE — PROGRESS NOTES
Pt presents for routine post-op visit.  No subjective complaints  Pathology from surgery on 8-24-18 revealed benign nipple duct adenoma as expected from both nipple punch bx and connecting subareolar tissue removed at the same time.  Benign pathology reviewed with patient and a copy of report provided to her.    L NAC surgical site CDI, healing appropriately with good cosmetic result and no signs of infection.  Will schedule for a new baseline left diag MMG with indira in late January or early February of 2019.

## 2018-09-17 DIAGNOSIS — D24.2: Primary | ICD-10-CM

## 2019-01-16 ENCOUNTER — PATIENT MESSAGE (OUTPATIENT)
Dept: SURGERY | Facility: CLINIC | Age: 44
End: 2019-01-16

## 2019-01-17 ENCOUNTER — TELEPHONE (OUTPATIENT)
Dept: SURGERY | Facility: CLINIC | Age: 44
End: 2019-01-17

## 2019-01-17 NOTE — TELEPHONE ENCOUNTER
Spoke to Patient.  She is calling to schedule a bilateral Mammogram in April '19.  Informed her that only a Left Diagnostic Mammogram had been ordered.  Stated that she also had some calcifications removed from her Right breast.  Informed her that I would check on the order.  Spoke to Marilee (Dr. Smiley) who explained that an additional order would be needed to have a right Mammogram - which can be ordered by her PCP, GYN...    Called patient back - Mailbox is full.

## 2019-01-18 ENCOUNTER — TELEPHONE (OUTPATIENT)
Dept: SURGERY | Facility: CLINIC | Age: 44
End: 2019-01-18

## 2019-01-18 NOTE — TELEPHONE ENCOUNTER
Called Patient.  Mailbox continues to be full.  Sent an e-mail to [email or web address] to explain that she will need to get an order from whoever normally orders her yearly Mammogram to have a right Mammogram screening or diagnostic added to her Left Diagnostic Mammogram that is scheduled on 4-5-19 at 2 pm.  Appointment slip for the Left Diagnostic Mammogram and a copy of the e-mail mailed to Patient.

## 2019-03-07 DIAGNOSIS — N60.91 ATYPICAL LOBULAR HYPERPLASIA (ALH) OF RIGHT BREAST: ICD-10-CM

## 2019-03-07 DIAGNOSIS — N64.52 DISCHARGE FROM LEFT NIPPLE: Primary | ICD-10-CM

## 2019-04-03 ENCOUNTER — PATIENT MESSAGE (OUTPATIENT)
Dept: SURGERY | Facility: CLINIC | Age: 44
End: 2019-04-03

## 2019-04-04 DIAGNOSIS — D24.2: Primary | ICD-10-CM

## 2019-04-05 ENCOUNTER — HOSPITAL ENCOUNTER (OUTPATIENT)
Dept: RADIOLOGY | Facility: HOSPITAL | Age: 44
Discharge: HOME | End: 2019-04-05
Attending: SURGERY
Payer: COMMERCIAL

## 2019-04-05 VITALS — BODY MASS INDEX: 21.58 KG/M2 | WEIGHT: 118 LBS

## 2019-04-05 DIAGNOSIS — D24.2: ICD-10-CM

## 2019-04-05 PROCEDURE — 77063 BREAST TOMOSYNTHESIS BI: CPT | Mod: 26,,, | Performed by: RADIOLOGY

## 2019-04-05 PROCEDURE — 77067 MAMMO DIGITAL SCREENING BILAT WITH TOMOSYNTHESIS_CAD: ICD-10-PCS | Mod: 26,,, | Performed by: RADIOLOGY

## 2019-04-05 PROCEDURE — 77063 MAMMO DIGITAL SCREENING BILAT WITH TOMOSYNTHESIS_CAD: ICD-10-PCS | Mod: 26,,, | Performed by: RADIOLOGY

## 2019-04-05 PROCEDURE — 77067 SCR MAMMO BI INCL CAD: CPT | Mod: 26,,, | Performed by: RADIOLOGY

## 2019-04-05 PROCEDURE — 77067 SCR MAMMO BI INCL CAD: CPT | Mod: TC

## 2021-05-12 ENCOUNTER — PATIENT MESSAGE (OUTPATIENT)
Dept: RESEARCH | Facility: HOSPITAL | Age: 46
End: 2021-05-12

## 2021-05-26 NOTE — Clinical Note
Estella, this patient will need a new baseline left diag MMG with indira in late Jan or early Feb 2019.F/U with me prn.Thanks, RLC Noted and appreciated. Please route back when patient/wife calls back to schedule. Thank you!

## 2023-04-04 ENCOUNTER — TELEPHONE (OUTPATIENT)
Dept: SURGERY | Facility: CLINIC | Age: 48
End: 2023-04-04
Payer: COMMERCIAL

## 2023-04-04 NOTE — TELEPHONE ENCOUNTER
----- Message from Marilee Denny RN sent at 4/4/2023  2:11 PM CDT -----  Regarding: FW: Pt missed a call from the nurse today regarding scheduling an appt and she would like a call back today    ----- Message -----  From: Amber Bautista  Sent: 4/4/2023   2:08 PM CDT  To: , #  Subject: Pt missed a call from the nurse today regard#    Appointment Access Request:    Pt missed a call from the nurse today regarding scheduling an appt and she would like a call back today    Pt can be reached at [phone] cell or  work [phone] opt 5

## 2023-04-04 NOTE — TELEPHONE ENCOUNTER
----- Message from Skyler Quintanilla sent at 4/4/2023  7:40 AM CDT -----  Pt would like to be called back asap regarding scheduling a f/u appt  Last seen Dr. Lozoya in 2018  Pt can be reached at [phone].    Thanks

## 2023-04-04 NOTE — TELEPHONE ENCOUNTER
Spoke with patient. States she used to see Dr Lozoya for anal dilation, patient is in between jobs and insurance coverage. Patient was referred to Dr Pressley. Appt scheduled. Patient lives in [place], 5 hrs away. Patient reports attempting self dilation with fingers, but is still having trouble. Patient also reports rectal burning with defecation and is wondering if she has a fissure. Will discuss with NP to see what can be done or any suggestions.

## 2023-04-11 ENCOUNTER — TELEPHONE (OUTPATIENT)
Dept: SURGERY | Facility: CLINIC | Age: 48
End: 2023-04-11
Payer: COMMERCIAL

## 2023-04-14 ENCOUNTER — TELEPHONE (OUTPATIENT)
Dept: SURGERY | Facility: CLINIC | Age: 48
End: 2023-04-14
Payer: COMMERCIAL

## 2023-04-14 NOTE — TELEPHONE ENCOUNTER
Spoke with patient. States that she is at a new job and has a difficult time getting to the phone. Was wondering if Dr Pressley would advise using anal dilators in the meantime until patient's insurance kicks in? Patient does have a pouch. Message sent to Marilee Denny.

## 2023-04-14 NOTE — TELEPHONE ENCOUNTER
----- Message from Dakota Youngblood sent at 4/14/2023  1:28 PM CDT -----  Contact: @[phone]  Pt is calling in from a missed call, please call to discuss further.

## (undated) DEVICE — SOL 0.9% NACL IRRI.IN STERIL

## (undated) DEVICE — ADHESIVE DERMABOND ADVANCED

## (undated) DEVICE — ELECTRODE BLADE INSULATED 1 IN

## (undated) DEVICE — GAUZE SPONGE 4X4 12PLY

## (undated) DEVICE — SEE MEDLINE ITEM 146417

## (undated) DEVICE — SPONGE LAP 18X18 PREWASHED

## (undated) DEVICE — TRAY MINOR GEN SURG

## (undated) DEVICE — CONTAINER SPECIMEN STRL 4OZ

## (undated) DEVICE — SYR SLIP TIP 1CC

## (undated) DEVICE — SUT 2/0 30IN SILK BLK BRAI

## (undated) DEVICE — PACK UNIVERSAL SPLIT II

## (undated) DEVICE — NDL 18GA X1 1/2 REG BEVEL

## (undated) DEVICE — CATH IV INTROCAN 24G X 3/4

## (undated) DEVICE — ELECTRODE REM PLYHSV RETURN 9

## (undated) DEVICE — GAUZE FLUFF XXLG 36X36 2 PLY

## (undated) DEVICE — SUT MCRYL PLUS 4-0 PS2 27IN

## (undated) DEVICE — DRAPE STERI INSTRUMENT 1018

## (undated) DEVICE — SUT VICRYL 3-0 27 SH

## (undated) DEVICE — PUNCH BIOPSY 4MM STERILE DISPO